# Patient Record
Sex: FEMALE | Race: WHITE
[De-identification: names, ages, dates, MRNs, and addresses within clinical notes are randomized per-mention and may not be internally consistent; named-entity substitution may affect disease eponyms.]

---

## 2019-03-10 ENCOUNTER — HOSPITAL ENCOUNTER (EMERGENCY)
Dept: HOSPITAL 74 - JER | Age: 29
Discharge: HOME | End: 2019-03-10
Payer: COMMERCIAL

## 2019-03-10 VITALS — DIASTOLIC BLOOD PRESSURE: 70 MMHG | HEART RATE: 60 BPM | TEMPERATURE: 98.8 F | SYSTOLIC BLOOD PRESSURE: 121 MMHG

## 2019-03-10 VITALS — BODY MASS INDEX: 31.6 KG/M2

## 2019-03-10 DIAGNOSIS — R11.2: Primary | ICD-10-CM

## 2019-03-10 LAB
ALBUMIN SERPL-MCNC: 4.2 G/DL (ref 3.4–5)
ALP SERPL-CCNC: 58 U/L (ref 45–117)
ALT SERPL-CCNC: 18 U/L (ref 13–61)
ANION GAP SERPL CALC-SCNC: 8 MMOL/L (ref 8–16)
AST SERPL-CCNC: 11 U/L (ref 15–37)
BASOPHILS # BLD: 0.6 % (ref 0–2)
BILIRUB SERPL-MCNC: 0.4 MG/DL (ref 0.2–1)
BUN SERPL-MCNC: 5 MG/DL (ref 7–18)
CALCIUM SERPL-MCNC: 8.8 MG/DL (ref 8.5–10.1)
CHLORIDE SERPL-SCNC: 106 MMOL/L (ref 98–107)
CO2 SERPL-SCNC: 24 MMOL/L (ref 21–32)
CREAT SERPL-MCNC: 0.6 MG/DL (ref 0.55–1.3)
DEPRECATED RDW RBC AUTO: 13.7 % (ref 11.6–15.6)
EOSINOPHIL # BLD: 0.6 % (ref 0–4.5)
GLUCOSE SERPL-MCNC: 82 MG/DL (ref 74–106)
HCT VFR BLD CALC: 36.1 % (ref 32.4–45.2)
HGB BLD-MCNC: 12.4 GM/DL (ref 10.7–15.3)
LYMPHOCYTES # BLD: 30.4 % (ref 8–40)
MCH RBC QN AUTO: 29.4 PG (ref 25.7–33.7)
MCHC RBC AUTO-ENTMCNC: 34.4 G/DL (ref 32–36)
MCV RBC: 85.5 FL (ref 80–96)
MONOCYTES # BLD AUTO: 6 % (ref 3.8–10.2)
NEUTROPHILS # BLD: 62.4 % (ref 42.8–82.8)
PLATELET # BLD AUTO: 262 K/MM3 (ref 134–434)
PMV BLD: 9 FL (ref 7.5–11.1)
POTASSIUM SERPLBLD-SCNC: 3.8 MMOL/L (ref 3.5–5.1)
PROT SERPL-MCNC: 7.7 G/DL (ref 6.4–8.2)
RBC # BLD AUTO: 4.23 M/MM3 (ref 3.6–5.2)
SODIUM SERPL-SCNC: 139 MMOL/L (ref 136–145)
WBC # BLD AUTO: 7.1 K/MM3 (ref 4–10)

## 2019-03-10 PROCEDURE — 3E033GC INTRODUCTION OF OTHER THERAPEUTIC SUBSTANCE INTO PERIPHERAL VEIN, PERCUTANEOUS APPROACH: ICD-10-PCS | Performed by: EMERGENCY MEDICINE

## 2019-03-10 PROCEDURE — 3E033NZ INTRODUCTION OF ANALGESICS, HYPNOTICS, SEDATIVES INTO PERIPHERAL VEIN, PERCUTANEOUS APPROACH: ICD-10-PCS | Performed by: EMERGENCY MEDICINE

## 2019-03-10 NOTE — PDOC
History of Present Illness





- General


Chief Complaint: Pain


Stated Complaint: ABD PAIN


Time Seen by Provider: 03/10/19 15:11





- History of Present Illness


Initial Comments: 





03/10/19 15:17


27 yo F with no significant pmh who p/w vomiting, epigastria abdominal pain. 

Patient reports acute onset of sharp, fluctuating, non pleuritic, epigastria 

abdominal pain beginning Thursday (03/07/2019). Asscoiated with 2 days of NBNB 

emesis, now resolved. Endorses nausea, decreased appetite, and decreased PO 

intake. Abdominal pain worse with supine positioning, and PO intake. Last BM 

Thursday (03/07/19) with absent BPR. 


Denies abdominal trauma. 





Patient denies HA, vision change, palpitations, cough, wheezing, orthopena, PND

, leg swelling/pain, F,C, CP, SOB, urinary complaints, hematuria, BPR, diarrhea

, lightheadedness, weakness, sensory changes.





PMHx: as noted above. Denies h/o endoscopy, chronic NSAID use. Does not f/w GI.


Surgical: Denies abdominal surgery


ROS: as noted


SHx: Denies Etoh, IVDA, tobacco use


Allergies: NKDA











Past History





- Past Medical History


Allergies/Adverse Reactions: 


 Allergies











Allergy/AdvReac Type Severity Reaction Status Date / Time


 


No Known Allergies Allergy   Verified 03/10/19 14:26











Home Medications: 


Ambulatory Orders





Famotidine [Pepcid -] 20 mg PO BID #14 tablet 03/10/19 


Ondansetron [Ondansetron Odt] 8 mg PO PRN PRN #14 tab.rapdis 03/10/19 








COPD: No





- Suicide/Smoking/Psychosocial Hx


Smoking History: Never smoked





**Review of Systems





- Review of Systems


Comments:: 





03/10/19 15:17


GENERAL/CONSTITUTIONAL: No fever or chills. No weakness.


HEAD, EYES, EARS, NOSE AND THROAT: No change in vision. No ear pain or 

discharge. No sore throat.


CARDIOVASCULAR: No chest pain or shortness of breath


RESPIRATORY: No cough, wheezing, or hemoptysis.


GASTROINTESTINAL: + Abodminal pain, nausea, vomiting. No diarrhea.


GENITOURINARY: No dysuria, frequency, or change in urination.


MUSCULOSKELETAL: No joint or muscle swelling or pain. No neck or back pain.


SKIN: No rash


NEUROLOGIC: No headache, vertigo, loss of consciousness, or change in strength/

sensation.


ENDOCRINE: No increased thirst. No abnormal weight change


HEMATOLOGIC/LYMPHATIC: No anemia, easy bleeding, or history of blood clots.


ALLERGIC/IMMUNOLOGIC: No hives or skin allergy.








*Physical Exam





- Vital Signs


 Last Vital Signs











Temp Pulse Resp BP Pulse Ox


 


 98.8 F   60   18   121/70   98 


 


 03/10/19 14:23  03/10/19 14:23  03/10/19 14:23  03/10/19 14:23  03/10/19 14:23














- Physical Exam


Comments: 





03/10/19 15:17





GENERAL: Awake, alert, and fully oriented, in no acute distress


HEAD: No signs of trauma, normocephalic, atraumatic 


EYES: PERRLA, EOMI, sclera anicteric, conjunctiva clear


ENT: Hearing grossly normal, nares patent, oropharynx clear without


exudates. Moist mucosa


NECK: Normal ROM, supple, no lymphadenopathy, JVD, or masses


LUNGS: No distress, speaks full sentences, clear to auscultation bilaterally 


HEART: Regular rate and rhythm, normal S1 and S2, no murmurs, rubs or gallops, 

peripheral pulses normal and equal bilaterally. 


ABDOMEN: + Epigastria tpp.  Soft, NDS, normoactive bowel sounds.  No guarding, 

no rebound.  No masses. Neg CVA ttp. 


EXTREMITIES : Normal inspection, Normal range of motion, no edema.  No clubbing 

or cyanosis. 


NEUROLOGICAL: Cranial nerves II through XII grossly intact.  Normal speech, 

normal gait, no focal sensorimotor deficits 


SKIN: Warm, Dry, normal turgor, no rashes or lesions noted








Moderate Sedation





- Procedure Monitoring


Vital Signs: 


Procedure Monitoring Vital Signs











Temperature  98.8 F   03/10/19 14:23


 


Pulse Rate  60   03/10/19 14:23


 


Respiratory Rate  18   03/10/19 14:23


 


Blood Pressure  121/70   03/10/19 14:23


 


O2 Sat by Pulse Oximetry (%)  98   03/10/19 14:23











ED Treatment Course





- LABORATORY


CBC & Chemistry Diagram: 


 03/10/19 16:00





 03/10/19 16:00





Medical Decision Making





- Medical Decision Making





03/10/19 15:17


27 yo F with no significant pmh who p/w vomiting, and epigastric abdominal pain 

beginning 03/07/19. Vitals wnl, AF, A&Ox3. + epigatsria abdominal pain. + Dry 

mucous membranes. DDx: esophagitis, gastritis, biliary dz. pancreatitis, PUD, 

gastroenteritis. Will provide antiemetic, analgesic, IVF control, and reassess. 





ED Course: 





03/10/19 15:32


Maloox, Carafate, Tylenol, Famotidine, Zofran, NS





03/10/19 16:53


CBC,CMP: Unremarkable


Lipase: Neg 


Serum Preg: Neg 








*DC/Admit/Observation/Transfer


Diagnosis at time of Disposition: 


 Epigastric abdominal pain








- Discharge Dispostion


Condition at time of disposition: Stable


Decision to Admit order: No





- Prescriptions


Prescriptions: 


Famotidine [Pepcid -] 20 mg PO BID #14 tablet


Ondansetron [Ondansetron Odt] 8 mg PO PRN PRN #14 tab.rapdis


 PRN Reason: Nausea





- Referrals


Referrals: 


Mara Spencer MD [Primary Care Provider] - 


Chacho Borges MD [Staff Physician] - 





- Patient Instructions


Printed Discharge Instructions:  DI for Abdominal Pain-Adult


Additional Instructions: 


Please return to the emergency department with any new or worsening symptoms or 

concerns. Please follow up with your primary care physician within 72 hours. 

Please follow up with gastroenterology within one week. Take Zofran, and Pepcid 

as needed for symptom relief. 








- Post Discharge Activity





- Attestations


Physician Attestion: 





03/10/19 15:18








I attest to the information provided in this note.